# Patient Record
Sex: FEMALE | Race: WHITE | NOT HISPANIC OR LATINO | Employment: OTHER | URBAN - METROPOLITAN AREA
[De-identification: names, ages, dates, MRNs, and addresses within clinical notes are randomized per-mention and may not be internally consistent; named-entity substitution may affect disease eponyms.]

---

## 2018-05-16 LAB
ALBUMIN SERPL BCP-MCNC: 3.6 G/DL (ref 3.5–5.7)
ALP SERPL-CCNC: 77 IU/L (ref 55–165)
ALT SERPL W P-5'-P-CCNC: 35 IU/L (ref 7–29)
AMYLASE (HISTORICAL): 51 U/L (ref 29–103)
ANION GAP SERPL CALCULATED.3IONS-SCNC: 10.7 MM/L
APTT PPP: 26.1 SEC (ref 24.4–37.6)
AST SERPL W P-5'-P-CCNC: 30 U/L (ref 7–26)
BASOPHILS # BLD AUTO: 0 X3/UL (ref 0–0.3)
BASOPHILS # BLD AUTO: 0.3 % (ref 0–2)
BILIRUB SERPL-MCNC: 0.9 MG/DL (ref 0.3–1)
BILIRUB UR QL STRIP: NEGATIVE
BUN SERPL-MCNC: 19 MG/DL (ref 7–25)
CALCIUM SERPL-MCNC: 9.8 MG/DL (ref 8.6–10.5)
CHLORIDE SERPL-SCNC: 106 MM/L (ref 98–107)
CLARITY UR: CLEAR
CO2 SERPL-SCNC: 26 MM/L (ref 21–31)
COLOR UR: YELLOW
CREAT SERPL-MCNC: 0.88 MG/DL (ref 0.6–1.2)
DEPRECATED RDW RBC AUTO: 14.2 %
EGFR (HISTORICAL): > 60 GFR
EGFR AFRICAN AMERICAN (HISTORICAL): > 60 GFR
EOSINOPHIL # BLD AUTO: 0.3 X3/UL (ref 0–0.5)
EOSINOPHIL NFR BLD AUTO: 4.1 % (ref 0–5)
GLUCOSE (HISTORICAL): 169 MG/DL (ref 65–99)
GLUCOSE UR STRIP-MCNC: NEGATIVE MG/DL
HCT VFR BLD AUTO: 40 % (ref 37–47)
HGB BLD-MCNC: 13.6 G/DL (ref 12–16)
HGB UR QL STRIP.AUTO: NEGATIVE
INR PPP: 1.13 (ref 0.9–1.5)
KETONES UR STRIP-MCNC: NEGATIVE MG/DL
LEUKOCYTE ESTERASE UR QL STRIP: NEGATIVE
LIPASE SERPL-CCNC: 28 U/L (ref 11–82)
LYMPHOCYTES # BLD AUTO: 1.7 X3/UL (ref 1.2–4.2)
LYMPHOCYTES NFR BLD AUTO: 25.9 % (ref 20.5–51.1)
MCH RBC QN AUTO: 30.3 PG (ref 26–34)
MCHC RBC AUTO-ENTMCNC: 34 G/DL (ref 31–37)
MCV RBC AUTO: 89 FL (ref 81–99)
MONOCYTES # BLD AUTO: 0.5 X3/UL (ref 0–1)
MONOCYTES NFR BLD AUTO: 6.9 % (ref 1.7–12)
NEUTROPHILS # BLD AUTO: 4.2 X3/UL (ref 1.4–6.5)
NEUTS SEG NFR BLD AUTO: 62.8 % (ref 42.2–75.2)
NITRITE UR QL STRIP: NEGATIVE
OSMOLALITY, SERUM (HISTORICAL): 284 MOSM (ref 262–291)
PH UR STRIP.AUTO: 6 [PH] (ref 4.5–8)
PLATELET # BLD AUTO: 116 X3/UL (ref 130–400)
PMV BLD AUTO: 9.5 FL
POTASSIUM SERPL-SCNC: 3.7 MM/L (ref 3.5–5.5)
PROT UR STRIP-MCNC: NEGATIVE MG/DL
PROTHROMBIN TIME (HISTORICAL): 13 SEC (ref 10.1–12.9)
RBC # BLD AUTO: 4.49 X6/UL (ref 3.9–5.2)
SODIUM SERPL-SCNC: 139 MM/L (ref 134–143)
SP GR UR STRIP.AUTO: 1.02 (ref 1–1.03)
TOTAL PROTEIN (HISTORICAL): 6.3 G/DL (ref 6.4–8.9)
UROBILINOGEN UR QL STRIP.AUTO: 0.2 EU/DL (ref 0.2–8)
WBC # BLD AUTO: 6.6 X3/UL (ref 4.8–10.8)

## 2018-05-29 LAB
ALBUMIN SERPL BCP-MCNC: 3.8 G/DL (ref 3.5–5.7)
ALP SERPL-CCNC: 77 IU/L (ref 55–165)
ALT SERPL W P-5'-P-CCNC: 25 IU/L (ref 7–29)
AMPHETAMINES (HISTORICAL): NEGATIVE
ANION GAP SERPL CALCULATED.3IONS-SCNC: 9.6 MM/L
AST SERPL W P-5'-P-CCNC: 27 U/L (ref 7–26)
BACTERIA UR QL AUTO: ABNORMAL /HPF
BARBITURATES (HISTORICAL): NEGATIVE
BASOPHILS # BLD AUTO: 0 X3/UL (ref 0–0.3)
BASOPHILS # BLD AUTO: 0.6 % (ref 0–2)
BENZODIAZEPINES (HISTORICAL): NEGATIVE
BILIRUB SERPL-MCNC: 1 MG/DL (ref 0.3–1)
BILIRUB UR QL STRIP: NEGATIVE
BUN SERPL-MCNC: 10 MG/DL (ref 7–25)
CALCIUM SERPL-MCNC: 10 MG/DL (ref 8.6–10.5)
CANNABINOIDS (HISTORICAL): NEGATIVE
CHLORIDE SERPL-SCNC: 107 MM/L (ref 98–107)
CLARITY UR: ABNORMAL
CO2 SERPL-SCNC: 28 MM/L (ref 21–31)
COCAINE (HISTORICAL): NEGATIVE
COLOR UR: YELLOW
CREAT SERPL-MCNC: 0.84 MG/DL (ref 0.6–1.2)
DEPRECATED RDW RBC AUTO: 14.5 %
EGFR (HISTORICAL): > 60 GFR
EGFR AFRICAN AMERICAN (HISTORICAL): > 60 GFR
EOSINOPHIL # BLD AUTO: 0.3 X3/UL (ref 0–0.5)
EOSINOPHIL NFR BLD AUTO: 4.3 % (ref 0–5)
ETHANOL (HISTORICAL): < 10 MG/DL
GLUCOSE (HISTORICAL): 132 MG/DL (ref 65–99)
GLUCOSE UR STRIP-MCNC: NEGATIVE MG/DL
HCT VFR BLD AUTO: 42.4 % (ref 37–47)
HGB BLD-MCNC: 14 G/DL (ref 12–16)
HGB UR QL STRIP.AUTO: ABNORMAL
KETONES UR STRIP-MCNC: NEGATIVE MG/DL
LEUKOCYTE ESTERASE UR QL STRIP: ABNORMAL
LYMPHOCYTES # BLD AUTO: 1.8 X3/UL (ref 1.2–4.2)
LYMPHOCYTES NFR BLD AUTO: 31.6 % (ref 20.5–51.1)
MCH RBC QN AUTO: 29.9 PG (ref 26–34)
MCHC RBC AUTO-ENTMCNC: 33.1 G/DL (ref 31–37)
MCV RBC AUTO: 90.3 FL (ref 81–99)
MEDICAL ALCOHOL (HISTORICAL): 0 %
METHADONE (HISTORICAL): NEGATIVE
MONOCYTES # BLD AUTO: 0.2 X3/UL (ref 0–1)
MONOCYTES NFR BLD AUTO: 4.2 % (ref 1.7–12)
NEUTROPHILS # BLD AUTO: 3.5 X3/UL (ref 1.4–6.5)
NEUTS SEG NFR BLD AUTO: 59.3 % (ref 42.2–75.2)
NITRITE UR QL STRIP: POSITIVE
NON-SQ EPI CELLS URNS QL MICRO: ABNORMAL /LPF
OPIATES (HISTORICAL): NEGATIVE
OSMOLALITY, SERUM (HISTORICAL): 282 MOSM (ref 262–291)
OXYCODONE (HISTORICAL): NEGATIVE
PH UR STRIP.AUTO: 6 [PH] (ref 4.5–8)
PHENCYCLIDINE URINE (HISTORICAL): NEGATIVE
PLATELET # BLD AUTO: 110 X3/UL (ref 130–400)
PLEASE NOTE (HISTORICAL): YES
PMV BLD AUTO: 9.7 FL
POTASSIUM SERPL-SCNC: 3.6 MM/L (ref 3.5–5.5)
PROPOXYPHENE (HISTORICAL): NEGATIVE
PROT UR STRIP-MCNC: ABNORMAL MG/DL
RBC # BLD AUTO: 4.7 X6/UL (ref 3.9–5.2)
RBC #/AREA URNS AUTO: ABNORMAL /HPF
SODIUM SERPL-SCNC: 141 MM/L (ref 134–143)
SP GR UR STRIP.AUTO: >= 1.03 (ref 1–1.03)
TOTAL PROTEIN (HISTORICAL): 6.7 G/DL (ref 6.4–8.9)
TSH SERPL DL<=0.05 MIU/L-ACNC: 23.97 UIU/M (ref 0.45–5.33)
UROBILINOGEN UR QL STRIP.AUTO: 1 EU/DL (ref 0.2–8)
WBC # BLD AUTO: 5.8 X3/UL (ref 4.8–10.8)
WBC #/AREA URNS AUTO: ABNORMAL /HPF

## 2018-05-30 LAB
ANION GAP SERPL CALCULATED.3IONS-SCNC: 12.5 MM/L
BASOPHILS # BLD AUTO: 0 X3/UL (ref 0–0.3)
BASOPHILS # BLD AUTO: 0.6 % (ref 0–2)
BUN SERPL-MCNC: 10 MG/DL (ref 7–25)
CALCIUM SERPL-MCNC: 9.8 MG/DL (ref 8.6–10.5)
CHLORIDE SERPL-SCNC: 108 MM/L (ref 98–107)
CO2 SERPL-SCNC: 26 MM/L (ref 21–31)
CREAT SERPL-MCNC: 0.78 MG/DL (ref 0.6–1.2)
DEPRECATED RDW RBC AUTO: 14.4 %
EGFR (HISTORICAL): > 60 GFR
EGFR AFRICAN AMERICAN (HISTORICAL): > 60 GFR
EOSINOPHIL # BLD AUTO: 0.3 X3/UL (ref 0–0.5)
EOSINOPHIL NFR BLD AUTO: 4.6 % (ref 0–5)
GLUCOSE (HISTORICAL): 147 MG/DL (ref 65–99)
HCT VFR BLD AUTO: 38.4 % (ref 37–47)
HGB BLD-MCNC: 12.8 G/DL (ref 12–16)
LYMPHOCYTES # BLD AUTO: 2.1 X3/UL (ref 1.2–4.2)
LYMPHOCYTES NFR BLD AUTO: 35.1 % (ref 20.5–51.1)
MCH RBC QN AUTO: 30 PG (ref 26–34)
MCHC RBC AUTO-ENTMCNC: 33.3 G/DL (ref 31–37)
MCV RBC AUTO: 89.9 FL (ref 81–99)
MONOCYTES # BLD AUTO: 0.3 X3/UL (ref 0–1)
MONOCYTES NFR BLD AUTO: 5.7 % (ref 1.7–12)
NEUTROPHILS # BLD AUTO: 3.2 X3/UL (ref 1.4–6.5)
NEUTS SEG NFR BLD AUTO: 54 % (ref 42.2–75.2)
OSMOLALITY, SERUM (HISTORICAL): 287 MOSM (ref 262–291)
PLATELET # BLD AUTO: 102 X3/UL (ref 130–400)
PMV BLD AUTO: 10.1 FL
POTASSIUM SERPL-SCNC: 3.5 MM/L (ref 3.5–5.5)
RBC # BLD AUTO: 4.28 X6/UL (ref 3.9–5.2)
SODIUM SERPL-SCNC: 143 MM/L (ref 134–143)
T4 FREE SERPL-MCNC: 0.9 NG/DL (ref 0.6–1.7)
TSH SERPL DL<=0.05 MIU/L-ACNC: 26.95 UIU/M (ref 0.45–5.33)
WBC # BLD AUTO: 5.9 X3/UL (ref 4.8–10.8)

## 2018-06-01 LAB
ACCESSION NUMBER (HISTORICAL): 149.01
COLLECTION DATE (HISTORICAL): NORMAL
CULTURE STATUS (HISTORICAL): NORMAL
MRSA SCREEN (HISTORICAL): NEGATIVE
SPECIMEN SOURCE (HISTORICAL): NORMAL
SPECIMEN TYPE RECEIVED: (HISTORICAL): NORMAL
TEST INFORMATION (HISTORICAL): NORMAL

## 2018-08-31 ENCOUNTER — HOSPITAL ENCOUNTER (EMERGENCY)
Facility: HOSPITAL | Age: 78
Discharge: OTHER INSTITUTION | End: 2018-09-01
Admitting: INTERNAL MEDICINE
Payer: MEDICARE

## 2018-08-31 DIAGNOSIS — F03.91 DEMENTIA WITH BEHAVIORAL PROBLEM (HCC): Primary | ICD-10-CM

## 2018-08-31 LAB
ALBUMIN SERPL BCP-MCNC: 3.8 G/DL (ref 3.5–5.7)
ALP SERPL-CCNC: 93 U/L (ref 55–165)
ALT SERPL W P-5'-P-CCNC: 27 U/L (ref 7–52)
ANION GAP SERPL CALCULATED.3IONS-SCNC: 10 MMOL/L (ref 4–13)
AST SERPL W P-5'-P-CCNC: 28 U/L (ref 13–39)
BASOPHILS # BLD AUTO: 0 THOUSANDS/ΜL (ref 0–0.1)
BASOPHILS NFR BLD AUTO: 0 % (ref 0–2)
BILIRUB SERPL-MCNC: 0.7 MG/DL (ref 0.2–1)
BUN SERPL-MCNC: 21 MG/DL (ref 7–25)
CALCIUM SERPL-MCNC: 10.3 MG/DL (ref 8.6–10.5)
CHLORIDE SERPL-SCNC: 106 MMOL/L (ref 98–107)
CO2 SERPL-SCNC: 26 MMOL/L (ref 21–31)
CREAT SERPL-MCNC: 1.2 MG/DL (ref 0.6–1.2)
EOSINOPHIL # BLD AUTO: 0.2 THOUSAND/ΜL (ref 0–0.61)
EOSINOPHIL NFR BLD AUTO: 2 % (ref 0–5)
ERYTHROCYTE [DISTWIDTH] IN BLOOD BY AUTOMATED COUNT: 14.6 % (ref 11.5–14.5)
ETHANOL SERPL-MCNC: <10 MG/DL
GFR SERPL CREATININE-BSD FRML MDRD: 43 ML/MIN/1.73SQ M
GLUCOSE SERPL-MCNC: 154 MG/DL (ref 65–99)
HCT VFR BLD AUTO: 38.2 % (ref 34.8–46.1)
HGB BLD-MCNC: 13 G/DL (ref 12–16)
LYMPHOCYTES # BLD AUTO: 1.1 THOUSANDS/ΜL (ref 0.6–4.47)
LYMPHOCYTES NFR BLD AUTO: 12 % (ref 21–51)
MCH RBC QN AUTO: 30 PG (ref 26–34)
MCHC RBC AUTO-ENTMCNC: 33.9 G/DL (ref 31–37)
MCV RBC AUTO: 89 FL (ref 81–99)
MONOCYTES # BLD AUTO: 0.4 THOUSAND/ΜL (ref 0.17–1.22)
MONOCYTES NFR BLD AUTO: 5 % (ref 2–12)
NEUTROPHILS # BLD AUTO: 7.4 THOUSANDS/ΜL (ref 1.4–6.5)
NEUTS SEG NFR BLD AUTO: 81 % (ref 42–75)
NRBC BLD AUTO-RTO: 0 /100 WBCS
PLATELET # BLD AUTO: 152 THOUSANDS/UL (ref 149–390)
PMV BLD AUTO: 10 FL (ref 8.6–11.7)
POTASSIUM SERPL-SCNC: 4 MMOL/L (ref 3.5–5.5)
PROT SERPL-MCNC: 6.9 G/DL (ref 6.4–8.9)
RBC # BLD AUTO: 4.32 MILLION/UL (ref 3.9–5.2)
SODIUM SERPL-SCNC: 142 MMOL/L (ref 134–143)
TSH SERPL DL<=0.05 MIU/L-ACNC: 0.55 UIU/ML (ref 0.45–5.33)
WBC # BLD AUTO: 9.1 THOUSAND/UL (ref 4.8–10.8)

## 2018-08-31 PROCEDURE — 84443 ASSAY THYROID STIM HORMONE: CPT

## 2018-08-31 PROCEDURE — 93005 ELECTROCARDIOGRAM TRACING: CPT

## 2018-08-31 PROCEDURE — 80320 DRUG SCREEN QUANTALCOHOLS: CPT

## 2018-08-31 PROCEDURE — 80053 COMPREHEN METABOLIC PANEL: CPT

## 2018-08-31 PROCEDURE — 85025 COMPLETE CBC W/AUTO DIFF WBC: CPT

## 2018-08-31 PROCEDURE — 36415 COLL VENOUS BLD VENIPUNCTURE: CPT

## 2018-08-31 RX ORDER — MELATONIN
2000 DAILY
COMMUNITY

## 2018-08-31 RX ORDER — LISINOPRIL 10 MG/1
10 TABLET ORAL DAILY
COMMUNITY

## 2018-08-31 RX ORDER — ATORVASTATIN CALCIUM 10 MG/1
10 TABLET, FILM COATED ORAL DAILY
COMMUNITY

## 2018-08-31 RX ORDER — QUETIAPINE FUMARATE 25 MG/1
37.5 TABLET, FILM COATED ORAL
COMMUNITY

## 2018-08-31 RX ORDER — LEVOTHYROXINE SODIUM 112 UG/1
112 CAPSULE ORAL DAILY
COMMUNITY

## 2018-08-31 RX ORDER — MIRTAZAPINE 15 MG/1
15 TABLET, FILM COATED ORAL
COMMUNITY

## 2018-08-31 NOTE — ED NOTES
Call to [facility]   Message left for Crisis Worker to call back regarding bed availability  Voice mail also left at [facility]   No beds at [facility]  [facility] no longer accepts geriatric patients  Call to [facility]   They do have availability and will review referral   Packet faxed

## 2018-08-31 NOTE — ED PROVIDER NOTES
History  Chief Complaint   Patient presents with   Alicia Hemphill Psychiatric Evaluation     pt  arriving from [facility] on 302  see [address] is a 77-year-old female who was brought to the emergency department by ambulance crew on a 302 petition that was filed by the [facility] staff today where she resides  Patient was noted to be combative and aggressive after some things were taken out of her room today  She denies chest pain or shortness of breath  On arrival in the emergency department, patient was cooperative and denies any knowledge why she was brought to the ED  Psychiatric Evaluation   Presenting symptoms: aggressive behavior and agitation    Patient accompanied by: Ambulance crew  Degree of incapacity (severity): Unable to specify  Onset quality:  Sudden  Duration: Today  Timing:  Unable to specify  Progression:  Resolved  Chronicity:  Recurrent  Context: not alcohol use, not drug abuse, not medication, not noncompliant, not recent medication change and not stressful life event    Treatment compliance:  Unable to specify  Relieved by:  Nothing  Worsened by:  Nothing  Ineffective treatments:  None tried  Associated symptoms: no abdominal pain, no anhedonia, no anxiety, no appetite change, no chest pain, no decreased need for sleep, not distractible, no euphoric mood, no fatigue, no feelings of worthlessness, no headaches, no hypersomnia, no hyperventilation, no insomnia, no irritability, no poor judgment and no weight change        Prior to Admission Medications   Prescriptions Last Dose Informant Patient Reported? Taking?    Levothyroxine Sodium 112 MCG CAPS 8/31/2018 at Unknown time  Yes Yes   Sig: Take 112 mcg by mouth daily   Menthol-Zinc Oxide (RISAMINE EX) Past Month at Unknown time  Yes Yes   Sig: Apply topically daily as needed   QUEtiapine (SEROquel) 25 mg tablet 8/31/2018 at Unknown time  Yes Yes   Sig: Take 37 5 mg by mouth daily at bedtime   atorvastatin (LIPITOR) 10 mg tablet 8/31/2018 at Unknown time  Yes Yes   Sig: Take 10 mg by mouth daily   cholecalciferol (VITAMIN D3) 1,000 units tablet 8/31/2018 at Unknown time  Yes Yes   Sig: Take 2,000 Units by mouth daily   lisinopril (ZESTRIL) 10 mg tablet 8/31/2018 at Unknown time  Yes Yes   Sig: Take 10 mg by mouth daily   metFORMIN (GLUCOPHAGE) 500 mg tablet 8/31/2018 at Unknown time  Yes Yes   Sig: Take 500 mg by mouth 2 (two) times a day with meals   mirtazapine (REMERON) 15 mg tablet 8/31/2018 at Unknown time  Yes Yes   Sig: Take 15 mg by mouth daily at bedtime   sertraline (ZOLOFT) 50 mg tablet 8/31/2018 at Unknown time  Yes Yes   Sig: Take 50 mg by mouth daily      Facility-Administered Medications: None       Past Medical History:   Diagnosis Date    Depression     Diabetes mellitus ([facility] [address] )     Disease of thyroid gland     History of fall     Hyperlipidemia     Hypertension     Vitamin D deficiency        History reviewed  No pertinent surgical history  History reviewed  No pertinent family history  I have reviewed and agree with the history as documented  Social History   Substance Use Topics    Smoking status: Never Smoker    Smokeless tobacco: Never Used    Alcohol use No        Review of Systems   Constitutional: Negative for appetite change, fatigue and irritability  HENT: Negative  Eyes: Negative  Respiratory: Negative  Cardiovascular: Negative for chest pain  Gastrointestinal: Negative for abdominal pain  Endocrine: Negative  Genitourinary: Negative  Musculoskeletal: Negative  Allergic/Immunologic: Negative  Neurological: Negative for headaches  Hematological: Negative  Psychiatric/Behavioral: Positive for agitation and behavioral problems  The patient is not nervous/anxious and does not have insomnia  Physical Exam  Physical Exam   Constitutional: She is oriented to person, place, and time  She appears well-developed and well-nourished  No distress     HENT: Head: Normocephalic and atraumatic  Right Ear: External ear normal    Left Ear: External ear normal    Nose: Nose normal    Mouth/Throat: Oropharynx is clear and moist    Eyes: Conjunctivae and EOM are normal  Pupils are equal, round, and reactive to light  Right eye exhibits no discharge  Left eye exhibits no discharge  No scleral icterus  Neck: Normal range of motion  Neck supple  No tracheal deviation present  No thyromegaly present  Cardiovascular: Normal rate, regular rhythm, normal heart sounds and intact distal pulses  Pulmonary/Chest: Effort normal and breath sounds normal    Abdominal: Soft  Bowel sounds are normal  She exhibits no distension  Musculoskeletal: Normal range of motion  She exhibits no edema, tenderness or deformity  Lymphadenopathy:     She has no cervical adenopathy  Neurological: She is alert and oriented to person, place, and time  No cranial nerve deficit  Coordination normal    Skin: Skin is warm and dry  No rash noted  She is not diaphoretic  No erythema  No pallor  Psychiatric: She has a normal mood and affect  Her behavior is normal  Judgment and thought content normal    Nursing note and vitals reviewed        Vital Signs  ED Triage Vitals [08/31/18 1628]   Temperature Pulse Respirations Blood Pressure SpO2   99 4 °F (37 4 °C) 96 16 149/65 99 %      Temp Source Heart Rate Source Patient Position - Orthostatic VS BP Location FiO2 (%)   Temporal -- -- -- --      Pain Score       No Pain           Vitals:    08/31/18 1628   BP: 149/65   Pulse: 96       Visual Acuity      ED Medications  Medications - No data to display    Diagnostic Studies  Results Reviewed     Procedure Component Value Units Date/Time    Ethanol [[id number]]  (Normal) Collected:  08/31/18 1647    Lab Status:  Final result Specimen:  Blood from Arm, Right Updated:  08/31/18 1716     Ethanol Lvl <10 mg/dL     CBC and differential [[id number]]  (Abnormal) Collected:  08/31/18 1647    Lab Status:  Final result Specimen:  Blood from Arm, Right Updated:  08/31/18 1705     WBC 9 10 Thousand/uL      RBC 4 32 Million/uL      Hemoglobin 13 0 g/dL      Hematocrit 38 2 %      MCV 89 fL      MCH 30 0 pg      MCHC 33 9 g/dL      RDW 14 6 (H) %      MPV 10 0 fL      Platelets 049 Thousands/uL      nRBC 0 /100 WBCs      Neutrophils Relative 81 (H) %      Lymphocytes Relative 12 (L) %      Monocytes Relative 5 %      Eosinophils Relative 2 %      Basophils Relative 0 %      Neutrophils Absolute 7 40 (H) Thousands/µL      Lymphocytes Absolute 1 10 Thousands/µL      Monocytes Absolute 0 40 Thousand/µL      Eosinophils Absolute 0 20 Thousand/µL      Basophils Absolute 0 00 Thousands/µL     Comprehensive metabolic panel [[id number]] Collected:  08/31/18 1647    Lab Status: In process Specimen:  Blood from Arm, Right Updated:  08/31/18 1651    TSH [[id number]] Collected:  08/31/18 1647    Lab Status: In process Specimen:  Blood from Arm, Right Updated:  08/31/18 1651    UA w Reflex to Microscopic [[id number]]     Lab Status:  No result Specimen:  Urine     Rapid drug screen, urine [[id number]]     Lab Status:  No result Specimen:  Urine                  No orders to display              Procedures  ECG 12 Lead Documentation  Date/Time: 8/31/2018 4:47 PM  Performed by: LEE Oconnor  Authorized by: LEE Oconnor     Indications / Diagnosis:  Medical clearance for U evaluation  ECG reviewed by me, the ED Provider: yes    Patient location:  ED  Previous ECG:     Previous ECG:  Unavailable    Comparison to cardiac monitor: No    Interpretation:     Interpretation: non-specific    Rate:     ECG rate:  85 beats per minute    ECG rate assessment: normal    Rhythm:     Rhythm: paced    Pacing:     Capture:  Complete    Type of pacing:  Ventricular  Ectopy:     Ectopy: none    QRS:     QRS axis:  Left    QRS intervals:   Wide  Conduction:     Conduction: abnormal      Abnormal conduction: 1st degree    ST segments:     ST segments: Non-specific  T waves:     T waves: non-specific    Comments:      Paced           Phone Contacts  ED Phone Contact    ED Course  ED Course as of Aug 31 1901   Fri Aug 31, 2018   1750 Patient is medically cleared for [facility] evaluation    1900 ED care was transferred to Dr Cabrera Li  [facility]  CritCare Time    Disposition  Final diagnoses:   Dementia with behavioral problem     Time reflects when diagnosis was documented in both MDM as applicable and the Disposition within this note     Time User Action Codes Description Comment    8/31/2018  4:31 PM Leodan Frost Add [F03 91] Dementia with behavioral problem       ED Disposition     None      Follow-up Information    None         Patient's Medications   Discharge Prescriptions    No medications on file     No discharge procedures on file      ED Provider  Electronically Signed by           Cecli Patrick MD  08/31/18 1901

## 2018-08-31 NOTE — ED NOTES
76year old female presents on a 36 petitioned by Assisted Living staff indicating that she has a mental health history and that she has been aggressive and difficult to redirect; throwing feces, unable to care for herself, and hostile toward others  Patient is alert and oriented to person and place  She denies suicidal ideation, past and current  Denies homicidal ideation, past and current  Denies depression  Reports anxiety only when staff are bothering her  She states that she prefers to be left alone, but staff come in and bother her and tell her there is feces all over her room and this upsets her and makes her shaky  Adamantly denies throwing feces  Cooperative, but poor insight  No recollection of mental health treatment and no knowledge of psychotropic medications  Denies sleep and appetite issues and denies hallucinations and delusions  Neither were evidenced during the evaluation  Declines offer of 201  Advised of and served with 302 rights and appears to understand

## 2018-08-31 NOTE — ED NOTES
Patient provided verbal permission to speak with her next of kin for collaborative information  Contact made with Noy Bella, sister-in-law, who reported that although she has not witnessed any behaviors, she has received reports of behaviors that are concerning and suggested that Crisis contact the [facility]  With patient's permission, contact was made with Harman Garay at [facility], who reported that in addition to the information on the 302, the patient has been refusing to come out of her room for meals, has been combative with staff (hitting them and throwing feces) and was chasing people with hangers earlier today  Details reviewed with Dr Ting Escobar, who opted to uphold the 36 petition as patient declines to sign herself in  Bed search to begin as [facility] is currently capped (as confirmed with Crisis Lead and Crisis Worker, JAQUI)

## 2018-08-31 NOTE — ED NOTES
Bed search continues:   [facility] has no beds and no expected availability until Tuesday  [facility] does not accept patients over 65  [facility] has a bed available and agrees to review the referral; packet faxed  [facility] cannot accept 302 admissions today due to the long holiday weekend  [facility] declined, indicating no available beds  No available beds in [address] per [facility] Nurse

## 2018-09-01 VITALS
DIASTOLIC BLOOD PRESSURE: 62 MMHG | BODY MASS INDEX: 34.99 KG/M2 | RESPIRATION RATE: 16 BRPM | HEART RATE: 71 BPM | HEIGHT: 65 IN | OXYGEN SATURATION: 97 % | TEMPERATURE: 99.4 F | WEIGHT: 210 LBS | SYSTOLIC BLOOD PRESSURE: 145 MMHG

## 2018-09-01 LAB
ATRIAL RATE: 85 BPM
P AXIS: 32 DEGREES
PR INTERVAL: 228 MS
QRS AXIS: -83 DEGREES
QRSD INTERVAL: 154 MS
QT INTERVAL: 408 MS
QTC INTERVAL: 485 MS
T WAVE AXIS: 87 DEGREES
VENTRICULAR RATE: 85 BPM

## 2018-09-01 PROCEDURE — 99285 EMERGENCY DEPT VISIT HI MDM: CPT

## 2018-09-01 NOTE — ED NOTES
Patient given fluids, taking fluids well  Resting comfortably and offering no complaints at this time        Melanie Lynch RN  08/31/18 6967

## 2018-09-01 NOTE — ED NOTES
Patient is resting comfortably  Encouraged multiple times to provide urine sample  States unable to at this time         Lea Lai RN  08/31/18 6219

## 2018-09-01 NOTE — ED NOTES
Patient given fluids, taking fluids well  Pt  Skin cleansed, hygiene addressed  Still unable to provide urine sample at this time        Nancylee Kanner, RN  09/01/18 5140

## 2018-09-01 NOTE — ED NOTES
Patient is accepted at [facility]   Patient is accepted by Dr Denia Frederick per Jony Ashland  Trans[facility] is arranged with [facility]     Transportation is scheduled for 02:30  Ehebe informed of same  Nurse report is to be called to [phone] prior to patient transfer

## 2018-09-01 NOTE — EMTALA/ACUTE CARE TRANSFER
[address] [facility]  Alexjennifer Do [facility] [address]  [address] [facility] [address]  [phone]  Dept: [phone]      EMTALA TRANSFER CONSENT    NAME Merlin Krone                                         1940                              MRN [id number]    I have been informed of my rights regarding examination, treatment, and transfer   by Dr Omar Ricks DO    Benefits: Specialized equipment and/or services available at the receiving facility (Include comment)________________________ (Psychiatric care)    Risks: Potential for delay in receiving treatment, Potential deterioration of medical condition, Increased discomfort during transfer      Consent for Transfer:  I acknowledge that my medical condition has been evaluated and explained to me by the emergency department physician or other qualified medical person and/or my attending physician, who has recommended that I be transferred to the service of  Accepting Physician: Dr of Merrily Gutierrez at [facility] Name, [address] : [facility] in hospital  The above potential benefits of such transfer, the potential risks associated with such transfer, and the probable risks of not being transferred have been explained to me, and I fully understand them  The doctor has explained that, in my case, the benefits of transfer outweigh the risks  I agree to be transferred  I authorize the performance of emergency medical procedures and treatments upon me in both transit and upon arrival at the receiving facility  Additionally, I authorize the release of any and all medical records to the receiving facility and request they be transported with me, if possible  I understand that the safest mode of transportation during a medical emergency is an ambulance and that the Hospital advocates the use of this mode of transport   Risks of traveling to the receiving facility by car, including absence of medical control, life sustaining equipment, such as oxygen, and medical personnel has been explained to me and I fully understand them  (BRITTNEY CORRECT BOX BELOW)  [  ]  I consent to the stated transfer and to be transported by ambulance/helicopter  [  ]  I consent to the stated transfer, but refuse transportation by ambulance and accept full responsibility for my transportation by car  I understand the risks of non-ambulance transfers and I exonerate the Hospital and its staff from any deterioration in my condition that results from this refusal     X___________________________________________    DATE  18  TIME________  Signature of patient or legally responsible individual signing on patient behalf           RELATIONSHIP TO PATIENT_________________________          Provider Certification    NAME Merlin Krone                                         1940                              MRN [id number]    A medical screening exam was performed on the above named patient  Based on the examination:    Condition Necessitating Transfer The encounter diagnosis was Dementia with behavioral problem      Patient Condition: The patient has been stabilized such that within reasonable medical probability, no material deterioration of the patient condition or the condition of the unborn child(kym) is likely to result from the transfer    Reason for Transfer: Level of Care needed not available at this facility    Transfer Requirements: [address] in hospital   · Space available and qualified personnel available for treatment as acknowledged by Annalisa Burn [phone]  · Agreed to accept transfer and to provide appropriate medical treatment as acknowledged by       Dr of Merrily Gutierrez  · Appropriate medical records of the examination and treatment of the patient are provided at the time of transfer   [address] _______  · Transfer will be performed by qualified personnel from [facility] [phone]  and appropriate transfer equipment as required, including the use of necessary and appropriate life support measures  Provider Certification: I have examined the patient and explained the following risks and benefits of being transferred/refusing transfer to the patient/family:  General risk, such as traffic hazards, adverse weather conditions, rough terrain or turbulence, possible failure of equipment (including vehicle or aircraft), or consequences of actions of persons outside the control of the transport personnel      Based on these reasonable risks and benefits to the patient and/or the unborn child(kym), and based upon the information available at the time of the patients examination, I certify that the medical benefits reasonably to be expected from the provision of appropriate medical treatments at another medical facility outweigh the increasing risks, if any, to the individuals medical condition, and in the case of labor to the unborn child, from effecting the transfer      X____________________________________________ DATE 09/01/18        TIME_______      ORIGINAL - SEND TO MEDICAL RECORDS   COPY - SEND WITH PATIENT DURING TRANSFER